# Patient Record
Sex: MALE | Race: WHITE | Employment: PART TIME | URBAN - NONMETROPOLITAN AREA
[De-identification: names, ages, dates, MRNs, and addresses within clinical notes are randomized per-mention and may not be internally consistent; named-entity substitution may affect disease eponyms.]

---

## 2021-11-29 ENCOUNTER — OFFICE VISIT (OUTPATIENT)
Dept: FAMILY MEDICINE CLINIC | Age: 26
End: 2021-11-29
Payer: COMMERCIAL

## 2021-11-29 VITALS
WEIGHT: 194.8 LBS | HEIGHT: 69 IN | RESPIRATION RATE: 16 BRPM | OXYGEN SATURATION: 98 % | BODY MASS INDEX: 28.85 KG/M2 | TEMPERATURE: 96.8 F | HEART RATE: 93 BPM

## 2021-11-29 DIAGNOSIS — Z83.3 FAMILY HISTORY OF DIABETES MELLITUS TYPE II: ICD-10-CM

## 2021-11-29 DIAGNOSIS — B36.0 TINEA VERSICOLOR: Primary | ICD-10-CM

## 2021-11-29 PROCEDURE — 99213 OFFICE O/P EST LOW 20 MIN: CPT | Performed by: STUDENT IN AN ORGANIZED HEALTH CARE EDUCATION/TRAINING PROGRAM

## 2021-11-29 PROCEDURE — G8484 FLU IMMUNIZE NO ADMIN: HCPCS | Performed by: STUDENT IN AN ORGANIZED HEALTH CARE EDUCATION/TRAINING PROGRAM

## 2021-11-29 PROCEDURE — 4004F PT TOBACCO SCREEN RCVD TLK: CPT | Performed by: STUDENT IN AN ORGANIZED HEALTH CARE EDUCATION/TRAINING PROGRAM

## 2021-11-29 PROCEDURE — G8419 CALC BMI OUT NRM PARAM NOF/U: HCPCS | Performed by: STUDENT IN AN ORGANIZED HEALTH CARE EDUCATION/TRAINING PROGRAM

## 2021-11-29 PROCEDURE — G8427 DOCREV CUR MEDS BY ELIG CLIN: HCPCS | Performed by: STUDENT IN AN ORGANIZED HEALTH CARE EDUCATION/TRAINING PROGRAM

## 2021-11-29 RX ORDER — KETOCONAZOLE 20 MG/ML
SHAMPOO TOPICAL
Qty: 1 EACH | Refills: 1 | Status: SHIPPED | OUTPATIENT
Start: 2021-11-29

## 2021-11-29 SDOH — ECONOMIC STABILITY: FOOD INSECURITY: WITHIN THE PAST 12 MONTHS, THE FOOD YOU BOUGHT JUST DIDN'T LAST AND YOU DIDN'T HAVE MONEY TO GET MORE.: NEVER TRUE

## 2021-11-29 SDOH — ECONOMIC STABILITY: FOOD INSECURITY: WITHIN THE PAST 12 MONTHS, YOU WORRIED THAT YOUR FOOD WOULD RUN OUT BEFORE YOU GOT MONEY TO BUY MORE.: NEVER TRUE

## 2021-11-29 ASSESSMENT — ENCOUNTER SYMPTOMS
VOMITING: 0
BLOOD IN STOOL: 0
CHEST TIGHTNESS: 0
COUGH: 0
ABDOMINAL PAIN: 0
WHEEZING: 0
NAUSEA: 0
SHORTNESS OF BREATH: 0
BACK PAIN: 0

## 2021-11-29 ASSESSMENT — PATIENT HEALTH QUESTIONNAIRE - PHQ9
1. LITTLE INTEREST OR PLEASURE IN DOING THINGS: 1
SUM OF ALL RESPONSES TO PHQ QUESTIONS 1-9: 2
SUM OF ALL RESPONSES TO PHQ QUESTIONS 1-9: 2
SUM OF ALL RESPONSES TO PHQ9 QUESTIONS 1 & 2: 2
SUM OF ALL RESPONSES TO PHQ QUESTIONS 1-9: 2
2. FEELING DOWN, DEPRESSED OR HOPELESS: 1

## 2021-11-29 ASSESSMENT — SOCIAL DETERMINANTS OF HEALTH (SDOH): HOW HARD IS IT FOR YOU TO PAY FOR THE VERY BASICS LIKE FOOD, HOUSING, MEDICAL CARE, AND HEATING?: NOT VERY HARD

## 2021-11-29 NOTE — PROGRESS NOTES
Health Maintenance Due   Topic Date Due    Hepatitis C screen  Never done    Varicella vaccine (1 of 2 - 2-dose childhood series) Never done    COVID-19 Vaccine (1) Never done    Pneumococcal 0-64 years Vaccine (1 of 2 - PPSV23) Never done    HIV screen  Never done    DTaP/Tdap/Td vaccine (1 - Tdap) Never done    Flu vaccine (1) Never done

## 2021-11-29 NOTE — PROGRESS NOTES
Jose Antonio Herman is a 27 y.o. male who presents today for:  Chief Complaint   Patient presents with    New Patient     establish         HPI:   Jose Antonio Herman is 27 y.o. who presents today with complaints of a rash. States the rash has been there for months, states it does not itch, burn, or bother him in any form it just has present for months. Denies any new shampoo body wash changes, laundry detergent, medications. Has been using selsun blue shampoo for the past month due to flakes in hair. Denies any recent illness. Has been using cetaphil on the rash with no improvement. Objective:     Vitals:    11/29/21 0822   Pulse: 93   Resp: 16   Temp: 96.8 °F (36 °C)   TempSrc: Skin   SpO2: 98%   Weight: 194 lb 12.8 oz (88.4 kg)   Height: 5' 9\" (1.753 m)       Wt Readings from Last 3 Encounters:   11/29/21 194 lb 12.8 oz (88.4 kg)   09/16/13 158 lb (71.7 kg)       BP Readings from Last 3 Encounters:   09/16/13 121/70       Lab Results   Component Value Date    WBC 7.0 09/09/2013    HGB 16.2 09/09/2013    HCT 48.7 09/09/2013    MCV 86.1 09/09/2013     09/09/2013     Lab Results   Component Value Date     09/09/2013    K 4.6 09/09/2013     09/09/2013    CO2 27 09/09/2013    BUN 15 09/09/2013    CREATININE 0.8 09/09/2013    GLUCOSE 97 09/09/2013    CALCIUM 9.8 09/09/2013    PROT 7.9 09/09/2013    LABALBU 4.8 09/09/2013    BILITOT 0.3 09/09/2013    ALKPHOS 68 09/09/2013    AST 29 09/09/2013    ALT 36 09/09/2013    LABGLOM >90 09/09/2013     No results found for: TSH, X1ALACO, A9EQKTF, THYROIDAB, FT3, T4FREE  No results found for: LABA1C  No results found for: EAG  No results found for: CHOL  No results found for: TRIG  No results found for: HDL  No results found for: LDLCHOLESTEROL, LDLCALC    No results found for: LABMICR, [id number]    Review of Systems   Constitutional: Negative for chills, fatigue and fever.    Respiratory: Negative for cough, chest tightness, shortness of breath and wheezing. Cardiovascular: Negative for chest pain and palpitations. Gastrointestinal: Negative for abdominal pain, blood in stool, nausea and vomiting. Genitourinary: Negative for hematuria. Musculoskeletal: Negative for back pain and myalgias. Skin: Positive for rash. Neurological: Positive for headaches. Psychiatric/Behavioral: Negative for behavioral problems, dysphoric mood, hallucinations, self-injury, sleep disturbance and suicidal ideas. The patient is not nervous/anxious. Physical Exam  Constitutional:       Appearance: Normal appearance. HENT:      Right Ear: External ear normal.      Left Ear: External ear normal.      Mouth/Throat:      Mouth: Mucous membranes are moist.   Eyes:      Extraocular Movements: Extraocular movements intact. Conjunctiva/sclera: Conjunctivae normal.      Pupils: Pupils are equal, round, and reactive to light. Cardiovascular:      Rate and Rhythm: Normal rate and regular rhythm. Pulmonary:      Effort: Pulmonary effort is normal. No respiratory distress. Breath sounds: Normal breath sounds. No wheezing. Abdominal:      General: Abdomen is flat. There is no distension. Palpations: Abdomen is soft. Tenderness: There is no abdominal tenderness. Skin:     General: Skin is warm. Findings: Rash (Macular, hypopigmented rash with multiple lesions diffusely throughout the posterior thoracic region and anterior chest) present. Neurological:      General: No focal deficit present. Mental Status: He is alert and oriented to person, place, and time. There is no immunization history on file for this patient.     Health Maintenance Due   Topic Date Due    Hepatitis C screen  Never done    Varicella vaccine (1 of 2 - 2-dose childhood series) Never done    COVID-19 Vaccine (1) Never done    Pneumococcal 0-64 years Vaccine (1 of 2 - PPSV23) Never done    HIV screen  Never done    DTaP/Tdap/Td vaccine (1 - Tdap) Never done    Flu vaccine (1) Never done          Food Insecurity: No Food Insecurity    Worried About Running Out of Food in the Last Year: Never true    Ran Out of Food in the Last Year: Never true       Assessment / Plan:      Diagnosis Orders   1. Tinea versicolor  ketoconazole (NIZORAL) 2 % shampoo   2. Family history of diabetes mellitus type II     3. BMI 28.0-28.9,adult       1. Tinea versicolor  - Macular, hypopigmented rash with multiple lesions on the posterior thoracic region and a few smaller lesions on the anterior chest, appears consistent with tinea versicolor  - Will begin Nizoral shampoo, discussed with patient continuing treatment for 6 weeks and to f/u in 4 weeks and assess for improvement of symptoms   - ketoconazole (NIZORAL) 2 % shampoo; Apply topically daily as needed. Dispense: 1 each; Refill: 1    2. Family history of diabetes mellitus type II  - Counseled patient on importance of exercise and good dietary habits including decreasing sugar from food and beverages to decrease risk of T2DM     3. BMI 28.0-28.9,adult  - Weight has increased, counseled patient on diet and exercise         Preventative Medicine   HIV/Hep C: Declined at this time  Influenza Vaccine: Declined  Covid Vaccine: Declined  TDaP: Will pursue at pharmacy  Varicella Vaccine: Had Chicken Pox as a child, consider titer testing in the future          Return in about 4 weeks (around 12/27/2021), or if symptoms worsen or fail to improve. Medications Prescribed:  Orders Placed This Encounter   Medications    ketoconazole (NIZORAL) 2 % shampoo     Sig: Apply topically daily as needed. Dispense:  1 each     Refill:  1       Future Appointments   Date Time Provider Nikkie Reina   12/29/2021  9:00 AM DO ANANT [facility] [address] [facility]       Patient given educational materials - see patient instructions. Discussed use, benefit, and sideeffects of prescribed medications. All patient questions answered.   Pt voiced understanding. Reviewed health maintenance. Instructed to continue current medications, diet and exercise. Patient agreed with treatment plan. Follow up as directed.      Electronically signed by Debera Goodell, DO on 11/29/2021 at 11:54 AM

## 2021-11-29 NOTE — PROGRESS NOTES
S: 27 y.o. male with   Chief Complaint   Patient presents with    New Patient     establish     Rash for a few months. Worsening in chest. Mostly on back. Irregular. No itching or burning. Tried OTC cetaphil. Noticed some flaking in scalp, tried selsum blue. No changes in products. No reported animal exposures. No contacts with similar rash. Fairly healthy otherwise. Stomach issues in the past resolved. Has gained some weight. Both parents with DM II. BP Readings from Last 3 Encounters:   09/16/13 121/70     Wt Readings from Last 3 Encounters:   11/29/21 194 lb 12.8 oz (88.4 kg)   09/16/13 158 lb (71.7 kg)       Family History   Problem Relation Age of Onset    Inflam Bowel Dis Mother     Diabetes type 2  Mother     Diabetes type 2  Father     Diabetes Maternal Grandmother     High Blood Pressure Maternal Grandmother     Heart Disease Paternal Grandmother     Stroke Paternal Grandfather     Heart Disease Paternal Grandfather     Colon Cancer Maternal Aunt     Lung Cancer Maternal Uncle        O: VS:   Vitals:    11/29/21 0822   Pulse: 93   Resp: 16   Temp: 96.8 °F (36 °C)   TempSrc: Skin   SpO2: 98%   Weight: 194 lb 12.8 oz (88.4 kg)   Height: 5' 9\" (1.753 m)     Body mass index is 28.77 kg/m². AAO/NAD, appropriate affect for mood  Normocephalic, atraumatic, eyes  conjunctiva and sclera normal,   skin salmon colored flat patches of irregular size on back and some upper chest.  No pattern. Insight, judgement normal and in no acute distress      Lab Results   Component Value Date    WBC 7.0 09/09/2013    HGB 16.2 09/09/2013    HCT 48.7 09/09/2013     09/09/2013    AST 29 09/09/2013     09/09/2013    K 4.6 09/09/2013     09/09/2013    CREATININE 0.8 09/09/2013    BUN 15 09/09/2013    CO2 27 09/09/2013    LABGLOM >90 09/09/2013    CALCIUM 9.8 09/09/2013       No results found. Diagnosis Orders   1. Tinea versicolor  ketoconazole (NIZORAL) 2 % shampoo   2. Family history of diabetes mellitus type II     3. BMI 28.0-28.9,adult         Plan    Nizoral shampoo for next 6 weeks. Recommended reduction of sugar intake from drinks and food, as a first step to prevent diabetes and be healthier. Return in about 4 weeks (around 12/27/2021), or if symptoms worsen or fail to improve. Orders Placed:  No orders of the defined types were placed in this encounter. Medications Prescribed:  Orders Placed This Encounter   Medications    ketoconazole (NIZORAL) 2 % shampoo     Sig: Apply topically daily as needed. Dispense:  1 each     Refill:  1       No future appointments. Health Maintenance Due   Topic Date Due    Hepatitis C screen  Never done    Varicella vaccine (1 of 2 - 2-dose childhood series) Never done    COVID-19 Vaccine (1) Never done    Pneumococcal 0-64 years Vaccine (1 of 2 - PPSV23) Never done    HIV screen  Never done    DTaP/Tdap/Td vaccine (1 - Tdap) Never done    Flu vaccine (1) Never done         Attending Physician Statement  I have discussed the case, including pertinent history and exam findings with the resident. I also have seen the patient and performed key portions of the examination. I agree with the documented assessment and plan as documented by the resident.   GE modifier added to this encounter      Vasquez Yost MD 11/29/2021 9:04 AM

## 2022-01-15 ENCOUNTER — TELEPHONE (OUTPATIENT)
Dept: FAMILY MEDICINE CLINIC | Age: 27
End: 2022-01-15

## 2022-01-15 NOTE — TELEPHONE ENCOUNTER
----- Message from Medina Urena sent at 1/14/2022 11:43 AM EST -----  Subject: Message to Provider    QUESTIONS  Information for Provider? pt needs antibiotic for his infected tooth, pt   can't be seen by dentist until February   ---------------------------------------------------------------------------  --------------  [address]  What is the best way for the office to contact you? OK to leave message on   voicemail  Preferred Call Back Phone Number? [phone]  ---------------------------------------------------------------------------  --------------  SCRIPT ANSWERS  Relationship to Patient? Parent  Representative Name? [address]  Patient is under 25 and the Parent has custody? No  Is the Representative on the appropriate HIPAA document in Epic?  Yes

## 2022-01-16 NOTE — TELEPHONE ENCOUNTER
Patient needs to be seen by a provider in the office for a prescription for antibiotics, can schedule an appointment with any provider this week.